# Patient Record
Sex: MALE | Race: WHITE
[De-identification: names, ages, dates, MRNs, and addresses within clinical notes are randomized per-mention and may not be internally consistent; named-entity substitution may affect disease eponyms.]

---

## 2017-03-14 ENCOUNTER — HOSPITAL ENCOUNTER (EMERGENCY)
Dept: HOSPITAL 63 - ER | Age: 37
Discharge: HOME | End: 2017-03-14
Payer: SELF-PAY

## 2017-03-14 VITALS — DIASTOLIC BLOOD PRESSURE: 90 MMHG | SYSTOLIC BLOOD PRESSURE: 140 MMHG

## 2017-03-14 VITALS — WEIGHT: 215 LBS | HEIGHT: 70 IN | BODY MASS INDEX: 30.78 KG/M2

## 2017-03-14 DIAGNOSIS — Z88.0: ICD-10-CM

## 2017-03-14 DIAGNOSIS — F12.10: ICD-10-CM

## 2017-03-14 DIAGNOSIS — R31.9: Primary | ICD-10-CM

## 2017-03-14 LAB
ALP SERPL-CCNC: 95 U/L (ref 46–116)
ALT SERPL-CCNC: 46 U/L (ref 16–63)
ANION GAP SERPL CALC-SCNC: 6 MMOL/L (ref 6–14)
APTT PPP: (no result) S
AST SERPL-CCNC: 17 U/L (ref 15–37)
BACTERIA #/AREA URNS HPF: 0 /HPF
BASOPHILS # BLD AUTO: 0.1 X10^3/UL (ref 0–0.2)
BASOPHILS NFR BLD: 1 % (ref 0–3)
BILIRUB UR QL STRIP: (no result)
CA-I SERPL ISE-MCNC: 14 MG/DL (ref 8–26)
CALCIUM SERPL-MCNC: 8.7 MG/DL (ref 8.5–10.1)
CHLORIDE SERPL-SCNC: 102 MMOL/L (ref 98–107)
CO2 SERPL-SCNC: 28 MMOL/L (ref 21–32)
CREAT SERPL-MCNC: 1 MG/DL (ref 0.7–1.3)
EOSINOPHIL NFR BLD: 0.3 X10^3/UL (ref 0–0.7)
EOSINOPHIL NFR BLD: 4 % (ref 0–3)
ERYTHROCYTE [DISTWIDTH] IN BLOOD BY AUTOMATED COUNT: 13.1 % (ref 11.5–14.5)
FIBRINOGEN PPP-MCNC: (no result) MG/DL
GFR SERPLBLD BASED ON 1.73 SQ M-ARVRAT: 84.5 ML/MIN
GLUCOSE SERPL-MCNC: 98 MG/DL (ref 70–99)
GLUCOSE UR STRIP-MCNC: (no result) MG/DL
HCT VFR BLD CALC: 45.6 % (ref 39–53)
HGB BLD-MCNC: 15.4 G/DL (ref 13–17.5)
LYMPHOCYTES # BLD: 2.3 X10^3/UL (ref 1–4.8)
LYMPHOCYTES NFR BLD AUTO: 30 % (ref 24–48)
MCH RBC QN AUTO: 31 PG (ref 25–35)
MCHC RBC AUTO-ENTMCNC: 34 G/DL (ref 31–37)
MCV RBC AUTO: 92 FL (ref 79–100)
MONO #: 0.6 X10^3/UL (ref 0–1.1)
MONOCYTES NFR BLD: 7 % (ref 0–9)
NEUT #: 4.5 X10^3UL (ref 1.8–7.7)
NEUTROPHILS NFR BLD AUTO: 58 % (ref 31–73)
NITRITE UR QL STRIP: (no result)
PLATELET # BLD AUTO: 277 X10^3/UL (ref 140–400)
POTASSIUM SERPL-SCNC: 4 MMOL/L (ref 3.5–5.1)
RBC # BLD AUTO: 4.95 X10^6/UL (ref 4.3–5.7)
RBC #/AREA URNS HPF: (no result) /HPF (ref 0–2)
SODIUM SERPL-SCNC: 136 MMOL/L (ref 136–145)
SP GR UR STRIP: >=1.03
SQUAMOUS #/AREA URNS LPF: (no result) /LPF
UROBILINOGEN UR-MCNC: 0.2 MG/DL
WBC # BLD AUTO: 7.8 X10^3/UL (ref 4–11)
WBC #/AREA URNS HPF: (no result) /HPF (ref 0–4)

## 2017-03-14 PROCEDURE — 85027 COMPLETE CBC AUTOMATED: CPT

## 2017-03-14 PROCEDURE — 84460 ALANINE AMINO (ALT) (SGPT): CPT

## 2017-03-14 PROCEDURE — 36415 COLL VENOUS BLD VENIPUNCTURE: CPT

## 2017-03-14 PROCEDURE — 74177 CT ABD & PELVIS W/CONTRAST: CPT

## 2017-03-14 PROCEDURE — 84075 ASSAY ALKALINE PHOSPHATASE: CPT

## 2017-03-14 PROCEDURE — 85610 PROTHROMBIN TIME: CPT

## 2017-03-14 PROCEDURE — 80048 BASIC METABOLIC PNL TOTAL CA: CPT

## 2017-03-14 PROCEDURE — 84450 TRANSFERASE (AST) (SGOT): CPT

## 2017-03-14 PROCEDURE — 85730 THROMBOPLASTIN TIME PARTIAL: CPT

## 2017-03-14 PROCEDURE — 99285 EMERGENCY DEPT VISIT HI MDM: CPT

## 2017-03-14 PROCEDURE — 81001 URINALYSIS AUTO W/SCOPE: CPT

## 2017-03-14 NOTE — ED.ADGEN
Past History


Past Medical History:  Other


Past Surgical History:  Tonsillectomy, Other


Smoking:  Non-smoker


Alcohol Use:  Rarely


Drug Use:  Marijuana





Adult General


HPI


HPI





Patient is a 35 y/o male c/o blood in urine this morning. Denies any h/o 

similar episodes. Denies any trauma, pain, or recent illness. Denies cp, dyspnea

, dizziness, lightheadedness.





Review of Systems


Review of Systems





Constitutional: Denies fever or chills []


Eyes: Denies change in visual acuity, redness, or eye pain []


HENT: Denies nasal congestion or sore throat []


Respiratory: Denies cough or shortness of breath []


Cardiovascular: No additional information not addressed in HPI []


GI: Denies abdominal pain, nausea, vomiting, bloody stools or diarrhea []


: Denies dysuria or hematuria []


Musculoskeletal: Denies back pain or joint pain []


Integument: Denies rash or skin lesions []


Neurologic: Denies headache, focal weakness or sensory changes []


Endocrine: Denies polyuria or polydipsia []





Current Medications


Current Medications








 Current Medications








 Medications


  (Trade)  Dose


 Ordered  Sig/Joesph  Start Time


 Stop Time Status Last Admin


Dose Admin


 


 Iohexol


  (Omnipaque 300


 Mg/ml)  75 ml  1X  ONCE  3/14/17 12:00


 3/14/17 12:02 DC 3/14/17 11:56


75 ML














Allergies


Allergies





 Allergies








Coded Allergies Type Severity Reaction Last Updated Verified


 


  Penicillins Allergy Intermediate Nausea 7/26/15 Yes











Physical Exam


Physical Exam





Constitutional: Well developed, well nourished, no acute distress, non-toxic 

appearance. []


HENT: Normocephalic, atraumatic, bilateral external ears normal, oropharynx 

moist, no oral exudates, nose normal. []


Eyes: PERRLA, EOMI, conjunctiva normal, no discharge. [] 


Neck: Normal range of motion, no tenderness, supple, no stridor. [] 


Cardiovascular:Heart rate regular rhythm, no murmur []


Lungs & Thorax:  Bilateral breath sounds clear to auscultation []


Abdomen: Bowel sounds normal, soft, no tenderness, no masses, no pulsatile 

masses. [] 


Skin: Warm, dry, no erythema, no rash. [] 


Back: No tenderness, no CVA tenderness. [] 


Extremities: No tenderness, no cyanosis, no clubbing, ROM intact, no edema. [] 


Neurologic: Alert and oriented X 3, normal motor function, normal sensory 

function, no focal deficits noted. []


Psychologic: Affect normal, judgement normal, mood normal. []





Current Patient Data


Vital Signs





 Vital Signs








  Date Time  Temp Pulse Resp B/P Pulse Ox O2 Delivery O2 Flow Rate FiO2


 


3/14/17 10:15 97.4 90 18  96 Room Air  








Lab Results





 Laboratory Tests








Test


  3/14/17


11:00 3/14/17


11:39


 


Urine Collection Type Unknown   


 


Urine Color Red   


 


Urine Clarity Bloody   


 


Urine pH 5.5   


 


Urine Specific Gravity >=1.030   


 


Urine Protein


  100 mg/dl


(NEG-TRACE) 


 


 


Urine Glucose (UA)


  Negmg/dL (NEG)


  


 


 


Urine Ketones (Stick)


  Negmg/dL (NEG)


  


 


 


Urine Blood Large (NEG)   


 


Urine Nitrite Neg (NEG)   


 


Urine Bilirubin Neg (NEG)   


 


Urine Urobilinogen Dipstick


  0.2mg/dL (0.2


mg/dL) 


 


 


Urine Leukocyte Esterase Neg (NEG)   


 


Urine RBC


  Tntc/HPF (0-2)


  


 


 


Urine WBC


  Rare/HPF (0-4)


  


 


 


Urine Squamous Epithelial


Cells Occ/LPF  


  


 


 


Urine Bacteria 0/HPF (0-FEW)   


 


White Blood Count


  


  7.8x10^3/uL


(4.0-11.0)


 


Red Blood Count


  


  4.95x10^6/uL


(4.30-5.70)


 


Hemoglobin


  


  15.4g/dL


(13.0-17.5)


 


Hematocrit


  


  45.6%


(39.0-53.0)


 


Mean Corpuscular Volume  92fL ()  


 


Mean Corpuscular Hemoglobin  31pg (25-35)  


 


Mean Corpuscular Hemoglobin


Concent 


  34g/dL (31-37)


 


 


Red Cell Distribution Width


  


  13.1%


(11.5-14.5)


 


Platelet Count


  


  277x10^3/uL


(140-400)


 


Neutrophils (%) (Auto)  58% (31-73)  


 


Lymphocytes (%) (Auto)  30% (24-48)  


 


Monocytes (%) (Auto)  7% (0-9)  


 


Eosinophils (%) (Auto)  4% (0-3)  H


 


Basophils (%) (Auto)  1% (0-3)  


 


Neutrophils # (Auto)


  


  4.5x10^3uL


(1.8-7.7)


 


Lymphocytes # (Auto)


  


  2.3x10^3/uL


(1.0-4.8)


 


Monocytes # (Auto)


  


  0.6x10^3/uL


(0.0-1.1)


 


Eosinophils # (Auto)


  


  0.3x10^3/uL


(0.0-0.7)


 


Basophils # (Auto)


  


  0.1x10^3/uL


(0.0-0.2)


 


Prothrombin Time


  


  9.9SEC


(9.4-11.4)


 


Prothrombin Time INR  1.0 (0.9-1.1)  


 


PTT  27SEC (23-33)  


 


Sodium Level


  


  136mmol/L


(136-145)


 


Potassium Level


  


  4.0mmol/L


(3.5-5.1)


 


Chloride Level


  


  102mmol/L


()


 


Carbon Dioxide Level


  


  28mmol/L


(21-32)


 


Anion Gap  6 (6-14)  


 


Blood Urea Nitrogen


  


  14mg/dL (8-26)


 


 


Creatinine


  


  1.0mg/dL


(0.7-1.3)


 


Estimated GFR


(Cockcroft-Gault) 


  84.5  


 


 


Glucose Level


  


  98mg/dL


(70-99)


 


Calcium Level


  


  8.7mg/dL


(8.5-10.1)


 


Aspartate Amino Transferase


(AST) 


  17U/L (15-37)  


 


 


Alanine Aminotransferase (ALT)  46U/L (16-63)  


 


Alkaline Phosphatase


  


  95U/L ()


 











EKG


EKG


[]





Radiology/Procedures


Radiology/Procedures


[]





Course & Med Decision Making


Course & Med Decision Making


Pertinent Labs and Imaging studies reviewed. (See chart for details)


Initially, the patient noted he had hematuria and his urinalysis showed blood 

certainly but no signs of infection. I went to discharge the patient and he 

revealed that he has she had blood down the entire length of his leg. I once 

again clarified that he is having only bleeding when he urinates. He of 

reaffirmed this but stated that the bleeding ran down his leg this time. 

Further workup revealed no objective abnormalities. The patient appear to be in 

no distress other than worried about getting a work note. We reviewed the most 

common causes of hematuria including infection, kidney stone, bladder cancer 

versus other neoplasms or kidney abnormalities. Patient was urged to follow-up 

with a urologist and nephrologist as soon as possible. He is return emergency 

department sooner if he develops any new or worsening symptoms. I have no clear 

explanation for his hematuria and wonder somewhat given the amount of gross 

blood in his urine and on his leg at this was not possibly factitious. 

Nevertheless, the patient was hemodynamically stable with a hemoglobin of 

greater than 15 here and in no pain or distress at the time of discharge.


[]





Final Impression


Final Impression


Hematuria []


Problems:  





Dragon Disclaimer


Dragon Disclaimer


This electronic medical record was generated, in whole or in part, using a 

voice recognition dictation system.








LILIAN ALVARADO MD Mar 14, 2017 10:39

## 2017-03-14 NOTE — RAD
CT of the abdomen and pelvis with contrast, 3/14/2017:



History: Gross hematuria with painful urination



Multidetector CT imaging was performed following an IV bolus injection of

iodinated contrast material. No oral contrast material was administered for

this exam.



The liver is unremarkable. No gallbladder abnormality is seen. The pancreas

shows no abnormality. The spleen is of normal size. The kidneys show no

evidence of obstruction. Single tiny subcentimeter low density lesions in both

kidneys are too small to definitively characterize but are probably cysts. No

adrenal abnormality is detected.



The abdominal aorta is unremarkable. No periaortic, iliac or inguinal

adenopathy is seen. The urinary bladder is unremarkable. The prostate gland is

not enlarged. A small prostatic calcification is present.



The bowel loops are not dilated. Small celiac and portacaval lymph nodes are

identified. No definite pathologically enlarged mesenteric nodes are seen. No

free fluid or free air is evident in the abdomen or pelvis.





IMPRESSION:

1. Probable small bilateral renal cysts.

2. No acute abdominal or pelvic abnormality is detected.











PQRS Compliance Statement:



One or more of the following individualized dose reduction techniques were

utilized for this examination:

1. Automated exposure control

2. Adjustment of the mA and/or kV according to patient size

3. Use of iterative reconstruction technique

## 2017-10-22 ENCOUNTER — HOSPITAL ENCOUNTER (EMERGENCY)
Dept: HOSPITAL 63 - ER | Age: 37
Discharge: HOME | End: 2017-10-22
Payer: COMMERCIAL

## 2017-10-22 VITALS — HEIGHT: 70 IN | BODY MASS INDEX: 35.79 KG/M2 | WEIGHT: 250 LBS

## 2017-10-22 VITALS — SYSTOLIC BLOOD PRESSURE: 154 MMHG | DIASTOLIC BLOOD PRESSURE: 93 MMHG
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
## 2017-10-22 DIAGNOSIS — S61.210A: Primary | ICD-10-CM
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
## 2017-10-22 DIAGNOSIS — F12.10: ICD-10-CM
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
## 2017-10-22 DIAGNOSIS — Z88.0: ICD-10-CM

## 2017-10-22 PROCEDURE — 12001 RPR S/N/AX/GEN/TRNK 2.5CM/<: CPT

## 2017-10-22 PROCEDURE — 90715 TDAP VACCINE 7 YRS/> IM: CPT

## 2017-10-22 PROCEDURE — 90471 IMMUNIZATION ADMIN: CPT

## 2017-10-22 PROCEDURE — 99284 EMERGENCY DEPT VISIT MOD MDM: CPT

## 2017-10-22 NOTE — ED.ADGEN
Past History


Past Medical History:  No Pertinent History


Past Surgical History:  Tonsillectomy, Other


Smoking:  Non-smoker


Alcohol Use:  None


Drug Use:  Marijuana





Adult General


HPI


HPI





Patient is a [37-year-old man, with no significant past medical history, who 

presents to the emergency department with a complaint of a laceration to his 

right index finger. Patient states that he was placing siting in a cooler door, 

describes a thin piece of aluminum, when it slipped and he lacerated the 

lateral aspect of his finger between the distal and proximal phalanx. Patient 

has full range of motion, denies any possibility of foreign body insertion, 

states this is occurred just before he arrived in the emergency department. He 

is uncertain his last tetanus vaccination. He denies any other injuries, or 

complaints. Patient is left-handed.





Review of Systems


Review of Systems





Constitutional: Denies fever or chills []


Eyes: Denies change in visual acuity, redness, or eye pain []


HENT: Denies nasal congestion or sore throat []


Respiratory: Denies cough or shortness of breath []


Cardiovascular: No additional information not addressed in HPI []


GI: Denies abdominal pain, nausea, vomiting, bloody stools or diarrhea []


: Denies dysuria or hematuria []


Musculoskeletal: Denies back pain, pain in the distal aspect of the right 

phalanx.


Integument: Denies rash or skin lesions []


Neurologic: Denies headache, focal weakness or sensory changes []


Endocrine: Denies polyuria or polydipsia []





Current Medications


Current Medications





Current Medications








 Medications


  (Trade)  Dose


 Ordered  Sig/Joesph  Start Time


 Stop Time Status Last Admin


Dose Admin


 


 Cephalexin HCl


  (Keflex)  500 mg  1X  ONCE  10/22/17 12:15


 10/22/17 12:16 DC  


 


 


 Diphtheria/


 Tetanus/Acell


 Pertussis


  (Boostrix)  0.5 ml  ONCE ONCE  10/22/17 11:45


 10/22/17 11:46 DC 10/22/17 12:02


0.5 ML


 


 Lidocaine/Sodium


 Bicarbonate


  (Buffered


 Lidocaine 1%)  3 ml  1X  ONCE  10/22/17 11:45


 10/22/17 11:46 DC 10/22/17 12:03


3 ML


 


 Naproxen


  (Naprosyn)  500 mg  1X  ONCE  10/22/17 11:45


 10/22/17 11:46 DC 10/22/17 12:03


500 MG











Allergies


Allergies





Allergies








Coded Allergies Type Severity Reaction Last Updated Verified


 


  Penicillins Allergy Intermediate Nausea 7/26/15 Yes











Physical Exam


Physical Exam





Constitutional: Well developed, well nourished, no acute distress, non-toxic 

appearance. []


HENT: Normocephalic, atraumatic, bilateral external ears normal, oropharynx 

moist, no oral exudates, nose normal. []


Eyes: PERRLA, EOMI, conjunctiva normal, no discharge. [] 


Cardiovascular:Heart rate regular rhythm, no murmur, S1, S2, rubs or gallops. []


Lungs & Thorax:  Bilateral breath sounds clear to auscultation, no wheezing, 

rhonchi, rales. []


Skin: Warm, dry, no erythema, no rash. [] 


Back: No tenderness, no CVA tenderness. [] 


Extremities: Patient with a 1/2 cm laceration that is U-shaped in the center of 

the middle phalanx, of the fifth digit of the right hand, slightly gaping, 

superficial, patient with full range of motion a card motions intact. No 

cyanosis, no clubbing, ROM intact, no edema. [] 


Neurologic: Alert and oriented X 3, normal motor function, normal sensory 

function, no focal deficits noted. []


Psychologic: Affect normal, judgement normal, mood normal. []





Current Patient Data


Vital Signs





 Vital Signs








  Date Time  Temp Pulse Resp B/P (MAP) Pulse Ox O2 Delivery O2 Flow Rate FiO2


 


10/22/17 11:32 98.1 97 18  94 Room Air  











EKG


EKG


Not indicated.[]





Radiology/Procedures


Radiology/Procedures


Not indicated.[]





Course & Med Decision Making


Course & Med Decision Making


Pertinent Labs and Imaging studies reviewed. (See chart for details)





Patient denies any possibility of foreign body, declines x-ray. Wound is 

superficial in nature. 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 

was replacing a paneling on the cooler door, when the metal slid and sliced 

into his finger. Patient's tetanus status updated in the emergency department. 

Patient received naproxen, area was initially soaked, and then irrigated with 

sterile saline. Wound is superficial, patient has full range of motion with 

current motions intact, no tendon involvement. Total of 5 simple interrupted 

sutures applied using 5-0 Ethilon after placement of a distal finger block with 

good effect. Steri-Strips are applied, patient instructed to use Keflex twice 

daily for the next 5 days for infection, due to the fact this was a work place 

injury in a kitchen, naproxen, acetaminophen, instructed on the packaging for 

discomfort. Patient's employer is aware of his injury today, patient was given 

instructions regarding abstaining from use a finger, and a foam splint was 

applied for extra support. 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 

function at his job without having to use his right hand, he is left-handed as 

stated, will follow up with his employer's work related injury protocol. We did 

discuss concerning symptoms that would prompt return to the emergency department

, patient to follow-up with his primary care provider return to the ED in 7 

days for suture removal, to return any time if any new or concerning symptoms 

develop. Patient discharged home in stable condition with prescriptions, plan 

and precautions as above.





Final Impression


Final Impression


[]


Problems:  





Dragon Disclaimer


Dragon Disclaimer


This electronic medical record was generated, in whole or in part, using a 

voice recognition dictation system.





Departure:


Impression:  


 Primary Impression:  


 Finger laceration


Disposition:  01 HOME, SELF-CARE


Condition:  IMPROVED


Scripts


Cephalexin (KEFLEX) 500 Mg Capsule


1 CAP PO BID, #10 CAP


   Prov: ROX GROVES DO         10/22/17





Laceration Repair


Lac Repair


Indication: 2 and half centimeter superficial laceration to the medial aspect 

of the fifth phalanx of the right hand.





Procedure: The patient was placed in the appropriate position and was 

administered, using a total of 3 mL of buffered 1% lidocaine for a nerve block 

with good effect]. The area was then irrigated after being soaked in normal 

saline. The laceration was approximated, total of 5 simple sutures using 5-0 

Ethilon were applied with good approximation and hemostasis. The wound area was 

then dressed with [Steri-Strips and sterile gauze, a foam splint was then 

applied for extra stability..  





Total repaired wound length: [2 and half centimeters]. 





Other Items: [None]





The patient tolerated the procedure well].





Complications: [None











ROX GROVES DO Oct 22, 2017 12:04

## 2017-11-09 ENCOUNTER — HOSPITAL ENCOUNTER (EMERGENCY)
Dept: HOSPITAL 63 - ER | Age: 37
Discharge: HOME | End: 2017-11-09
Payer: COMMERCIAL

## 2017-11-09 VITALS — HEIGHT: 70 IN | BODY MASS INDEX: 35.79 KG/M2 | WEIGHT: 250 LBS

## 2017-11-09 VITALS — SYSTOLIC BLOOD PRESSURE: 148 MMHG | DIASTOLIC BLOOD PRESSURE: 89 MMHG

## 2017-11-09 DIAGNOSIS — F17.210: ICD-10-CM

## 2017-11-09 DIAGNOSIS — R68.84: ICD-10-CM

## 2017-11-09 DIAGNOSIS — K08.89: Primary | ICD-10-CM

## 2017-11-09 DIAGNOSIS — F12.10: ICD-10-CM

## 2017-11-09 DIAGNOSIS — Z88.0: ICD-10-CM

## 2017-11-09 PROCEDURE — 99283 EMERGENCY DEPT VISIT LOW MDM: CPT

## 2017-11-09 NOTE — PHYS DOC
Past History


Past Medical History:  No Pertinent History


Additional Past Medical Histor:  multiple dental complaints


Past Surgical History:  Tonsillectomy, Other


Smoking:  Cigarettes


Alcohol Use:  None


Drug Use:  Marijuana





Adult General


Chief Complaint


Chief Complaint:  DENTAL PROBLEM





HPI


HPI





Patient is a 37 year old male who presents with left lower jaw pain. He states 

it started today. No fever. No difficulty swallowing. No drooling. No sore 

throat or ear ache. He has had multiple prior complaints for left lower dental 

and jaw pain. He does not see a dentist in quite some time. He does continue to 

smoke tobacco.





[facility] reviewed; no recent activity.





Review of Systems


Review of Systems





Constitutional: Denies fever or chills 


HENT: Denies nasal congestion or sore throat; left lower jaw pain. 


Respiratory: Denies cough or shortness of breath 








All other systems were reviewed and found to be within normal limits, except as 

documented in this note.





Allergies


Allergies





Allergies








Coded Allergies Type Severity Reaction Last Updated Verified


 


  Penicillins Allergy Intermediate Nausea 7/26/15 Yes











Physical Exam


Physical Exam





Constitutional: Well developed, well nourished, no acute distress, non-toxic 

appearance. 


HENT: Normocephalic, atraumatic, bilateral external ears normal, oropharynx 

moist, no oral exudates, nose normal. Poor dentition. Pain on palpation of 

lower dentition


Eyes: PERRLA, EOMI, conjunctiva normal, no discharge.  


Neck: Normal range of motion, no tenderness, supple, no stridor.  No 

lymphadenopathy


Cardiovascular:Heart rate regular rhythm, no murmur 


Lungs & Thorax:  Bilateral breath sounds clear to auscultation 


Neurologic: Alert and oriented X 3, normal motor function, normal sensory 

function, no focal deficits noted.





Current Patient Data


Vital Signs





 Vital Signs








  Date Time  Temp Pulse Resp B/P (MAP) Pulse Ox O2 Delivery O2 Flow Rate FiO2


 


11/9/17 19:42 99.3 104 20  95 Room Air  











Course & Med Decision Making


Course & Med Decision Making


Reviewed prior records and [facility]. The discomfort is in his lower teeth. We'll 

place him on antibiotic. Informed that if this persists he must be seen by a 

dentist. I informed him that I cannot diagnose no treat dental conditions as I 

am not a dentist. I am treating presumptively based on his clinical findings. 

No evidence of parotiditis or mumps.  





Rx; naprosyn and keflex





I have spoken with the patient and/or caregivers. I have explained the patient'

s condition, diagnosis and treatment plan based on the information available to 

me at this time. I have answered the patient's and/or caregiver's questions and 

addressed any concerns. The patient and/or caregivers have as good an 

understanding of the patient's diagnosis, condition and treatment plan as can 

be expected at this point. The patient's condition is stable and appropriate 

for discharge from the emergency department. 





The patient will pursue further outpatient evaluation with the primary care 

physician or other designated or consulting physician as outlined in the 

discharge instructions. The patient and/or caregivers are agreeable to this 

plan of care and follow-up instructions have been explained in detail. The 

patient and/or caregivers have received these instructions in written format 

and have expressed an understanding of the discharge instructions. The patient 

and/or caregivers are aware that any significant change in condition or 

worsening of symptoms should prompt an immediate return to this or the closest 

emergency department or a call to 911.





Dragon Disclaimer


Dragon Disclaimer


This electronic medical record was generated, in whole or in part, using a 

voice recognition dictation system.





Departure


Departure:


Impression:  


 Primary Impression:  


 Pain, dental


Disposition:  01 HOME, SELF-CARE


Condition:  STABLE


Referrals:  


PCP,NO (PCP)


Patient Instructions:  Dental Caries





Additional Instructions:  


YOU MUST BE SEEN BY A DENTIST IF THE PAIN PERSISTS.


Scripts


Cephalexin (KEFLEX) 500 Mg Capsule


1 CAP PO TID, #21 CAP


   Prov: RENEE NANCE MD         11/9/17 


Naproxen Sodium (NAPROXEN SODIUM) 550 Mg Tablet


1 TAB PO BID, #30 TAB


   Prov: RENEE NANCE MD         11/9/17











RENEE NANCE MD Nov 9, 2017 20:09

## 2019-02-05 ENCOUNTER — HOSPITAL ENCOUNTER (EMERGENCY)
Dept: HOSPITAL 63 - ER | Age: 39
Discharge: HOME | End: 2019-02-05
Payer: COMMERCIAL

## 2019-02-05 VITALS — WEIGHT: 250 LBS | HEIGHT: 70 IN | BODY MASS INDEX: 35.79 KG/M2

## 2019-02-05 VITALS — DIASTOLIC BLOOD PRESSURE: 90 MMHG | SYSTOLIC BLOOD PRESSURE: 139 MMHG

## 2019-02-05 DIAGNOSIS — K13.79: Primary | ICD-10-CM

## 2019-02-05 DIAGNOSIS — K05.10: ICD-10-CM

## 2019-02-05 DIAGNOSIS — K02.9: ICD-10-CM

## 2019-02-05 DIAGNOSIS — F17.210: ICD-10-CM

## 2019-02-05 DIAGNOSIS — Z88.0: ICD-10-CM

## 2019-02-05 PROCEDURE — 99283 EMERGENCY DEPT VISIT LOW MDM: CPT

## 2019-02-05 NOTE — ED.ADGEN
Past History


Past Medical History:  No Pertinent History


Additional Past Medical Histor:  multiple dental complaints


Past Surgical History:  Tonsillectomy, Other


Smoking:  Cigarettes


Alcohol Use:  None


Drug Use:  Marijuana





Adult General


Chief Complaint


Chief Complaint


''.. I got a bump in top my mouth... it started yesterday... when I swallow my 

tongue hits... it and it hurts..."





HPI


HPI





Patient is a 28 year old male who presents with a lesion in upper right hard 

palate which developed yesterday or became tender yesterday.  Patient has no 

pharyngeal erythema. No adenopathy.  No trismus.  There is no pointing abscess. 

Patient does have multiple areas of dental decay and gingivitis. Patient denies 

any history immunosuppression. Patient denies any travel or ill contacts. 

Patient denies any trauma.   Patient does smoke. Patient in the past has used 

methamphetamine.





Review of Systems


Review of Systems





Constitutional: Denies fever or chills []


Eyes: Denies change in visual acuity, redness, or eye pain []


HENT: Denies nasal congestion or sore throat []complaints of soft tissue lesion 

right upper hard palate and dental decay


Respiratory: Denies cough or shortness of breath []


Cardiovascular: No additional information not addressed in HPI []


GI: Denies abdominal pain, nausea, vomiting, bloody stools or diarrhea []


: Denies dysuria or hematuria []


Musculoskeletal: Denies back pain or joint pain []


Integument: Denies rash or skin lesions []


Neurologic: Denies headache, focal weakness or sensory changes []


Endocrine: Denies polyuria or polydipsia []





All other systems were reviewed and found to be within normal limits, except as 

documented in this note.





Family History


Family History


Noncontributory





Current Medications


Current Medications





Current Medications








 Medications


  (Trade)  Dose


 Ordered  Sig/Joesph  Start Time


 Stop Time Status Last Admin


Dose Admin


 


 Cephalexin HCl


  (Keflex)  500 mg  1X  ONCE  2/5/19 20:15


 2/5/19 20:16 DC 2/5/19 20:20


500 MG





See nursing for home meds





Allergies


Allergies





Allergies








Coded Allergies Type Severity Reaction Last Updated Verified


 


  Penicillins Allergy Intermediate Nausea 7/26/15 Yes











Physical Exam


Physical Exam





Constitutional: Mild distress, non-toxic appearance. []


HENT: Normocephalic, atraumatic, bilateral external ears normal, oropharynx 

moist, no oral exudates, nose normal. Multiple areas of gingivitis.  Multiple 

areas of dental decay. Small 1 cm soft tissue lesion right upper hard palate.  

No pointing abscess. No appreciable adenopathy.


Eyes: PERRLA, EOMI, conjunctiva normal, no discharge. [] 


Neck: Normal range of motion, no tenderness, supple, no stridor. [] 


Cardiovascular:Heart rate regular rhythm, no murmur []


Lungs & Thorax:  Bilateral breath sounds equal at apex with scattered wheezes 

on auscultation []


Abdomen: Bowel sounds normal, soft, no tenderness, no masses, no pulsatile 

masses. [] 


Skin: Warm, dry, no erythema, no rash. [] 


Back: No tenderness, no CVA tenderness. [] 


Extremities: No tenderness, no cyanosis, no clubbing, ROM intact, no edema. [] 


Neurologic: Alert and oriented X 3, normal motor function, normal sensory 

function, no focal deficits noted. []


Psychologic: Affect anxious, judgement normal, mood normal. []





Current Patient Data


Vital Signs





 Vital Signs








  Date Time  Temp Pulse Resp B/P (MAP) Pulse Ox O2 Delivery O2 Flow Rate FiO2


 


2/5/19 19:35 98.1 92 20  96 Room Air  











EKG


EKG


[]





Radiology/Procedures


Radiology/Procedures


[]





Course & Med Decision Making


Course & Med Decision Making


Pertinent Labs and Imaging studies reviewed. (See chart for details





Principal mouth frequently with Listerine. Take Keflex 500 mg 3 times a day. 

Tylenol and ibuprofen for pain. Must follow-up with Dentist.   Antibiotic in 

meds will not fix underlying oral and dental problems must see a dentist.  Must 

follow-up. Stop smoking. Follow-up primary care.





[]





Final Impression


Final Impression


1. 1 cm x 1 cm soft tissue lesion anterior right upper plate- hard palate  


2. Tobacco use[]


3. Multiple areas of dental decay


4. History of previous methamphetamine use





Dragon Disclaimer


Dragon Disclaimer


This electronic medical record was generated, in whole or in part, using a 

voice recognition dictation system.





Dragon Disclaimer


This chart was dictated in whole or in part using Voice Recognition software in 

a busy, high-work load, and often noisy Emergency Department environment.  It 

may contain unintended and wholly unrecognized errors or omissions.





Discharge Summary


Visit Information


Final Diagnosis


Problems


Medical Problems:


(1) Oral mucosal lesion


Status: Acute  











Brief Hospital Course


Allergies





 Allergies








Coded Allergies Type Severity Reaction Last Updated Verified


 


  Penicillins Allergy Intermediate Nausea 7/26/15 Yes








Vital Signs





Vital Signs








  Date Time  Temp Pulse Resp B/P (MAP) Pulse Ox O2 Delivery O2 Flow Rate FiO2


 


2/5/19 19:35 98.1 92 20  96 Room Air  








Brief Hospital Course


Mr. Carter  is a 38 old male who presented with oral lesion and multiple 

areas of dental decay.





Discharge Information


Condition at Discharge:  Improved, Stable


Disposition/Orders:  D/C to Home


Dischare Medications





Current Medications


Cephalexin HCl (Keflex) 500 mg 1X  ONCE PO  Last administered on 2/5/19at 20:20

; Admin Dose 500 MG;  Start 2/5/19 at 20:15;  Stop 2/5/19 at 20:16;  Status DC





Active Scripts


Active


Keflex (Cephalexin) 500 Mg Capsule 500 Mg PO TID 10 Days


Keflex (Cephalexin) 500 Mg Capsule 1 Cap PO TID


Naproxen Sodium 550 Mg Tablet 1 Tab PO BID


Keflex (Cephalexin) 500 Mg Capsule 1 Cap PO BID


Cipro (Ciprofloxacin Hcl) 500 Mg Tablet 1 Tab PO BID


Naproxen 500 Mg Tablet 1 Tab PO Q12HR PRN


Cyclobenzaprine Hcl 10 Mg Tablet 1 Tab PO Q8HRS PRN


Reported


No Known Medications Prior To Admisstion (Info)  Each 1 Each ALMA ROBERTSON MD Feb 5, 2019 19:49

## 2020-01-18 ENCOUNTER — HOSPITAL ENCOUNTER (EMERGENCY)
Dept: HOSPITAL 63 - ER | Age: 40
Discharge: HOME | End: 2020-01-18
Payer: COMMERCIAL

## 2020-01-18 VITALS — SYSTOLIC BLOOD PRESSURE: 109 MMHG | DIASTOLIC BLOOD PRESSURE: 75 MMHG

## 2020-01-18 VITALS — HEIGHT: 70 IN | WEIGHT: 269 LBS | BODY MASS INDEX: 38.51 KG/M2
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
## 2020-01-18 DIAGNOSIS — G89.11: ICD-10-CM

## 2020-01-18 DIAGNOSIS — R07.81: Primary | ICD-10-CM
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
## 2020-01-18 DIAGNOSIS — F17.210: ICD-10-CM
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
## 2020-01-18 PROCEDURE — 71046 X-RAY EXAM CHEST 2 VIEWS: CPT

## 2020-01-18 PROCEDURE — 99284 EMERGENCY DEPT VISIT MOD MDM: CPT

## 2020-01-18 NOTE — RAD
Two-view chest dated 1/18/2020.

 

Comparison made to 7/18/2011.

 

CLINICAL INDICATION: Cough and pain.

 

FINDINGS:

 

PA and lateral views obtained. Heart and mediastinal contours are stable. 

Lungs are clear. No consolidation or pleural effusion. No pneumothorax. No

apparent bony abnormality.

 

IMPRESSION:

 

No acute findings.

 

Electronically signed by: Jamarcus Casillas MD (1/18/2020 11:52 AM) 

[facility]

## 2020-01-18 NOTE — PHYS DOC
Past History


Past Medical History:  No Pertinent History


Additional Past Medical Histor:  multiple dental complaints


Past Surgical History:  Tonsillectomy, Other


Additional Past Surgical Histo:  EUSTATION TUBES


Smoking:  Cigarettes


Additional Smoking Information:  


PACK/DAY


Alcohol Use:  None


Drug Use:  None





Adult General


Chief Complaint


Chief Complaint:  RIB PAIN





HPI


HPI





Patient is a 39-year-old male who slipped on a child's toy and landed on his 

left rib few hours ago pain is moderate slowly worsening with time he's had a 

cough for couple weeks as well





Review of Systems


Review of Systems


Negative for fever negative for abdominal pain ote.





Current Medications


Current Medications





Current Medications








 Medications


  (Trade)  Dose


 Ordered  Sig/Joesph  Start Time


 Stop Time Status Last Admin


Dose Admin


 


 Acetaminophen


  (Tylenol)  1,000 mg  1X  ONCE  1/18/20 11:15


 1/18/20 11:16 DC 1/18/20 11:34


1,000 MG











Allergies


Allergies





Allergies








Coded Allergies Type Severity Reaction Last Updated Verified


 


  No Known Drug Allergies    1/18/20 No











Physical Exam


Physical Exam





Constitutional: Well developed, well nourished, no acute distress, non-toxic 

appearance. []


HENT: Normocephalic, atraumatic, bilateral external ears normal, oropharynx 

moist, no oral exudates, nose normal. []


Eyes: PERRLA, EOMI, conjunctiva normal, no discharge. [] 


Neck: Normal range of motion, no tenderness, supple, no stridor. [] 


Cardiovascular:Heart rate regular rhythm, no murmur []


Lungs & Thorax:  Bilateral breath sounds clear to auscultation []


Abdomen: Bowel sounds normal, soft, no tenderness, no masses, no pulsatile 

masses. [] 


Skin: Warm, dry, no erythema, no rash. [] 


Back: No tenderness, no CVA tenderness. [] 


Extremities: No tenderness, no cyanosis, no clubbing, ROM intact, no edema. [] 


Neurologic: Alert and oriented X 3, normal motor function, normal sensory 

function, no focal deficits noted. []


Psychologic: Affect normal, judgement normal, mood normal. []





Current Patient Data


Vital Signs





                                   Vital Signs








  Date Time  Temp Pulse Resp B/P (MAP) Pulse Ox O2 Delivery O2 Flow Rate FiO2


 


1/18/20 11:55  89 20 109/75 (86) 97 Room Air  


 


1/18/20 11:10 97.9       











EKG


EKG


[]





Radiology/Procedures


Radiology/Procedures


[]


Impressions:


FINDINGS:


 


PA and lateral views obtained. Heart and mediastinal contours are stable. 


Lungs are clear. No consolidation or pleural effusion. No pneumothorax. No


apparent bony abnormality.


 


IMPRESSION:


 


No acute findings.


 


Electronically signed by: Jamarcus Casillas MD (1/18/2020 11:52 AM) 


[facility]














DICTATED AND SIGNED BY:     JAMARCUS CASILLAS MD


DATE:     01/18/20 1159





CC: LA KHAN MD; PCP,NO ~





Course & Med Decision Making


Course & Med Decision Making


Pertinent Labs and Imaging studies reviewed. (See chart for details)





[]X-ray negative vitals normal patient reassured





Christy Disclaimer


Dragon Disclaimer


This electronic medical record was generated, in whole or in part, using a voice

 recognition dictation system.





Departure


Departure:


Impression:  


   Primary Impression:  


   Chest wall pain


Disposition:  01 HOME, SELF-CARE


Condition:  STABLE


Patient Instructions:  Chest Wall Pain, Easy-to-Read











LA KHAN MD            Jan 18, 2020 12:49

## 2022-03-01 ENCOUNTER — HOSPITAL ENCOUNTER (EMERGENCY)
Dept: HOSPITAL 63 - ER | Age: 42
Discharge: HOME | End: 2022-03-01
Payer: COMMERCIAL

## 2022-03-01 VITALS — DIASTOLIC BLOOD PRESSURE: 92 MMHG | SYSTOLIC BLOOD PRESSURE: 126 MMHG

## 2022-03-01 VITALS — HEIGHT: 70 IN | WEIGHT: 287.04 LBS | BODY MASS INDEX: 41.09 KG/M2

## 2022-03-01 DIAGNOSIS — F17.210: ICD-10-CM

## 2022-03-01 DIAGNOSIS — S61.210A: Primary | ICD-10-CM
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
## 2022-03-01 PROCEDURE — 99282 EMERGENCY DEPT VISIT SF MDM: CPT

## 2022-03-01 PROCEDURE — 12001 RPR S/N/AX/GEN/TRNK 2.5CM/<: CPT

## 2022-03-01 NOTE — PHYS DOC
Past History


Past Medical History:  No Pertinent History


Additional Past Medical Histor:  multiple dental complaints


Past Surgical History:  Tonsillectomy, Other


Additional Past Surgical Histo:  EUSTATION TUBES


Smoking:  Cigarettes


Alcohol Use:  None


Drug Use:  None





Adult General


HPI


HPI





Patient is a 41 year old male who presents with laceration.  He was working at a

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  He excellently 

lacerated the right index finger.  Complains of laceration only.  No loss of 

motor function.





Review of Systems


Review of Systems





Constitutional: Denies fever or chills 


Eyes: Denies change in visual acuity, redness, or eye pain 


HENT: Denies nasal congestion or sore throat 


Respiratory: Denies cough or shortness of breath 


Cardiovascular: No additional information not addressed in HPI 


GI: Denies abdominal pain, nausea, vomiting, bloody stools or diarrhea 


Musculoskeletal: Denies back pain or joint pain 


Integument: as documented in HPI


Neurologic: Denies 





All other systems were reviewed and found to be within normal limits, except as 

documented in this note.





Allergies


Allergies





Allergies








Coded Allergies Type Severity Reaction Last Updated Verified


 


  No Known Drug Allergies    1/18/20 No











Physical Exam


Physical Exam





Constitutional: Well developed, well nourished, no acute distress, non-toxic 

appearance


HENT: bilateral external ears normal, oropharynx moist, no oral exudates, nose 

normal. 


Eyes: PERRLA, EOMI, conjunctiva normal 


Neck: Normal range of motion 


Cardiovascular:Heart rate regular rhythm


Lungs & Thorax:  Bilateral breath sounds clear


Abdomen: Bowel sounds normal 


Skin: 1 cm laceration present over the radial aspect of the mid right index 

finger. 


Extremities: Flexion and extensor mechanisms intact about the affected finger.  

Distal capillary refill less than 2 seconds.  Sensation light touch intact.


Neurologic: Alert and oriented X 3





EKG


EKG


[]





Radiology/Procedures


Radiology/Procedures


Laceration repair: The right index finger is cleansed with Betadine and draped 

in the usual fashion.  Local anesthesia was provided with 1 mL of 2% lidocaine 

which is injected intradermally around the wound.  Following this, the wound is 

irrigated with normal saline.  A total of 3 simple interrupted sutures were 

placed using 4-0 nylon.  Wound edges are well approximated.  Sterile dressing is

 applied.  Patient tolerates well.  Total length of sutured skin is 1 cm.





Heart Score


C/O Chest Pain:  N/A


Risk Factors:


Risk Factors:  DM, Current or recent (<one month) smoker, HTN, HLP, family 

history of CAD, obesity.


Risk Scores:


Risk Factors:  DM, Current or recent (<one month) smoker, HTN, HLP, family 

history of CAD, obesity.





Course & Med Decision Making


Course & Med Decision Making


Pertinent Labs and Imaging studies reviewed. (See chart for details)





ED summary: Patient seen in the ER for minor injury/laceration to the right 

index finger.  Sutures were placed without difficulty.  Stable for discharge 

home.  Wound care is discussed.  He was recommended to come back in 8 to 10 days

 for suture removal.





Dragon Disclaimer


Dragon Disclaimer


This electronic medical record was generated, in whole or in part, using a voice

 recognition dictation system.





Departure


Departure:


Impression:  


   Primary Impression:  


   Laceration of finger


Disposition:  01 HOME / SELF CARE / HOMELESS


Condition:  GOOD


Referrals:  


PCPS[facility]I (PCP)


Patient Instructions:  Laceration Care, Adult











DORI CASE DO              Mar 1, 2022 10:51